# Patient Record
Sex: FEMALE | Race: OTHER | NOT HISPANIC OR LATINO | URBAN - METROPOLITAN AREA
[De-identification: names, ages, dates, MRNs, and addresses within clinical notes are randomized per-mention and may not be internally consistent; named-entity substitution may affect disease eponyms.]

---

## 2018-03-03 ENCOUNTER — EMERGENCY (EMERGENCY)
Facility: HOSPITAL | Age: 73
LOS: 1 days | Discharge: HOME | End: 2018-03-03
Admitting: EMERGENCY MEDICINE
Payer: COMMERCIAL

## 2018-03-03 VITALS
SYSTOLIC BLOOD PRESSURE: 155 MMHG | RESPIRATION RATE: 20 BRPM | TEMPERATURE: 99 F | DIASTOLIC BLOOD PRESSURE: 90 MMHG | OXYGEN SATURATION: 100 % | HEART RATE: 66 BPM

## 2018-03-03 PROCEDURE — 99284 EMERGENCY DEPT VISIT MOD MDM: CPT | Mod: 25

## 2018-03-03 RX ORDER — GABAPENTIN 400 MG/1
600 CAPSULE ORAL ONCE
Qty: 0 | Refills: 0 | Status: COMPLETED | OUTPATIENT
Start: 2018-03-03 | End: 2018-03-03

## 2018-03-03 RX ORDER — KETOROLAC TROMETHAMINE 30 MG/ML
60 SYRINGE (ML) INJECTION ONCE
Qty: 0 | Refills: 0 | Status: DISCONTINUED | OUTPATIENT
Start: 2018-03-03 | End: 2018-03-03

## 2018-03-03 RX ORDER — DIAZEPAM 5 MG
5 TABLET ORAL ONCE
Qty: 0 | Refills: 0 | Status: DISCONTINUED | OUTPATIENT
Start: 2018-03-03 | End: 2018-03-03

## 2018-03-03 RX ORDER — METOCLOPRAMIDE HCL 10 MG
10 TABLET ORAL ONCE
Qty: 0 | Refills: 0 | Status: COMPLETED | OUTPATIENT
Start: 2018-03-03 | End: 2018-03-03

## 2018-03-03 RX ADMIN — Medication 10 MILLIGRAM(S): at 23:39

## 2018-03-03 RX ADMIN — GABAPENTIN 600 MILLIGRAM(S): 400 CAPSULE ORAL at 23:39

## 2018-03-03 RX ADMIN — Medication 5 MILLIGRAM(S): at 23:39

## 2018-03-03 RX ADMIN — Medication 60 MILLIGRAM(S): at 23:39

## 2018-03-03 NOTE — ED ADULT TRIAGE NOTE - CHIEF COMPLAINT QUOTE
walkin patient with c/o intermittent right sided headache x 5 hrs. Reports hx trigeminal neuralgia. Referred by city md for control of intractable pain. Was given 10mg dex IM and 1000mg tylenol PO.

## 2018-03-03 NOTE — ED PROVIDER NOTE - PHYSICAL EXAMINATION
Gen - WFWN elderly F, NAD, comfortable in stretcher, non-toxic appearing, speaking in full sentences   Skin - warm, dry, intact  HEENT - AT/NC, PERRL, EOMI, 3mm b/l, no conjunctival injection, moist oral mucosa, o/p clear with no erythema, edema, or exudate, uvula midline, airway patent, neck supple and NT, FROM, no JVD or carotid bruits b/l, no palpable nodes  CV - S1S2, R/R/R  Resp - respiration non-labored, CTAB, symmetric bs b/l, no r/r/w  GI - NABS, soft, ND, NT, no rebound or guarding, no CVAT b/l   MS - w/w/p, no c/c/e, calves supple and NT, distal pulses symmetric b/l   Neuro - AxOx3, R parietal region tender to percussion, no temporal tenderness, erythema, edema, induration or crepitus, no focal neuro deficits, CN II-XII grossly intact, cerebellar function intact, negative pronator drift, negative nystagmus, ambulatory without gait disturbance

## 2018-03-03 NOTE — ED PROVIDER NOTE - MEDICAL DECISION MAKING DETAILS
AFVSS at time of d/c, pt non-toxic appearing and hemodynamically stable, results, ddx, and f/u plans discussed with pt at bedside, d/c'd home to f/u with PMD, strict return precautions discussed, prompt return to ER for any worsening or new sx, pt verbalized understanding. pt with h/o intractable trigeminal neuralgia, noted increased stressor recently, now with typical pain consistent with trigeminal neuralgia flare up, no focal neuro deficits on exam, AFVSS, pain adequately controlled in the ED, has had outpt recent CT and MRI brain, d/c'd home to f/u with neuro and pain management, strict return precautions discussed, prompt return to ER for any worsening or new sx, pt verbalized understanding.

## 2018-03-03 NOTE — ED PROVIDER NOTE - OBJECTIVE STATEMENT
71 yo F with PMHx of trigeminal neuralgia, GERD, sent from  for evaluation of R sided HA x 6 hrs.  Pt reports being under a lot of stress recently and noted flare up of her trigeminal neuralgia since last night.  Took some tylenol and motrin with no improvement.  Went to  today and s/p 10mg IM Dex and 1g APAP with no improvement in sx and sent in for further management.  Pain is typical of her trigeminal neuralgia and currently rated 7/10, intermittent, and feels like electric impulses. Associated with photophobia and phonophobia.  Denies dizziness, numbness, tingling, diplopia, change in vision/hearing/gait/mental status/speech, focal weakness, neck pain, rash, fever, chills, stiffness, CP, SOB, palpitations, diaphoresis, N/V/D/C, abdominal pain, change in urinary/bowel function, dysuria, hematuria, flank pain, and malaise. Has outpt neurologist, s/p CT and MRI with intractable trigeminal neuralgia

## 2018-03-04 VITALS — WEIGHT: 134.92 LBS | HEIGHT: 63 IN

## 2018-03-07 DIAGNOSIS — R51 HEADACHE: ICD-10-CM

## 2018-03-07 DIAGNOSIS — G50.0 TRIGEMINAL NEURALGIA: ICD-10-CM

## 2020-12-27 ENCOUNTER — EMERGENCY (EMERGENCY)
Facility: HOSPITAL | Age: 75
LOS: 1 days | Discharge: HOME | End: 2020-12-27
Attending: EMERGENCY MEDICINE | Admitting: EMERGENCY MEDICINE
Payer: COMMERCIAL

## 2020-12-27 VITALS
OXYGEN SATURATION: 98 % | SYSTOLIC BLOOD PRESSURE: 181 MMHG | HEIGHT: 63 IN | RESPIRATION RATE: 16 BRPM | HEART RATE: 68 BPM | TEMPERATURE: 99 F | DIASTOLIC BLOOD PRESSURE: 99 MMHG

## 2020-12-27 DIAGNOSIS — Z20.828 CONTACT WITH AND (SUSPECTED) EXPOSURE TO OTHER VIRAL COMMUNICABLE DISEASES: ICD-10-CM

## 2020-12-27 PROBLEM — K21.9 GASTRO-ESOPHAGEAL REFLUX DISEASE WITHOUT ESOPHAGITIS: Chronic | Status: ACTIVE | Noted: 2018-03-03

## 2020-12-27 PROBLEM — G50.0 TRIGEMINAL NEURALGIA: Chronic | Status: ACTIVE | Noted: 2018-03-03

## 2020-12-27 PROCEDURE — 99283 EMERGENCY DEPT VISIT LOW MDM: CPT

## 2020-12-27 NOTE — ED PROVIDER NOTE - CLINICAL SUMMARY MEDICAL DECISION MAKING FREE TEXT BOX
Asymptomatic patient here for COVID screening. Risk of exposure is very low. Reviewed vitals and testing plan with the patient. Encouraged to download the follow my health estrella for test results.

## 2020-12-27 NOTE — ED PROVIDER NOTE - NSFOLLOWUPINSTRUCTIONS_ED_ALL_ED_FT
THE COVID 19 TEST RESULTS  - results may take up to 2-3 days to become available   - if you have consented, you will receive your results electronically   -  you can check [facility] or call [phone] to discuss your results with our nursing staff    Please continue to self quarantine (home isolation) until your result is back and follow instructions accordingly  - positive: complete home isolation for a total of 14 days since day of testing and no more fever with feeling back to baseline   - negative: you will be able to stop home isolation but still practice standard precautions, similar to how you would manage a regular flu/cold.    Return to ER for any worsening symptoms, such as persistent fever >100.4F, shortness of breath, coughing up bloody sputum, chest pain, lethargy, and fainting    Please remember to wash your hands frequently (>20 seconds each time), avoid touching your face, and cover your cough/sneeze.  Always wear a mask when you are outside of your home and practice social distancing.    Only take Tylenol for fever/pain control and avoid NSAIDs (ibuprofen/Advil/Aleve/naproxen) due to potential increased risk of exacerbating COVID-19 infection

## 2020-12-27 NOTE — ED PROVIDER NOTE - PATIENT PORTAL LINK FT
You can access the FollowMyHealth Patient Portal offered by [facility] by registering at the following website: [email or web address]. By joining [employer]’s FollowMyHealth portal, you will also be able to view your health information using other applications (apps) compatible with our system.

## 2020-12-27 NOTE — ED PROVIDER NOTE - OBJECTIVE STATEMENT
The patient is presenting to the ED requesting COVID-19 screening prior to travel . The patient is currently asymptomatic and has no other medical complaints at this time. Denies headache,  fever, chills, chest pain, SOB, cough, body aches, change to sense of smell or taste

## 2021-01-25 ENCOUNTER — EMERGENCY (EMERGENCY)
Facility: HOSPITAL | Age: 76
LOS: 1 days | Discharge: HOME | End: 2021-01-25
Attending: EMERGENCY MEDICINE | Admitting: EMERGENCY MEDICINE
Payer: COMMERCIAL

## 2021-01-25 VITALS
TEMPERATURE: 98 F | HEIGHT: 63 IN | OXYGEN SATURATION: 96 % | SYSTOLIC BLOOD PRESSURE: 170 MMHG | DIASTOLIC BLOOD PRESSURE: 88 MMHG | HEART RATE: 84 BPM | RESPIRATION RATE: 18 BRPM

## 2021-01-25 DIAGNOSIS — U07.1 COVID-19: ICD-10-CM

## 2021-01-25 PROCEDURE — 99283 EMERGENCY DEPT VISIT LOW MDM: CPT

## 2021-01-25 NOTE — ED PROVIDER NOTE - OBJECTIVE STATEMENT
Patient is presenting to the Emergency Department with a request to have COVID-19 testing.  Denies symptoms, including cough, shortness of breath, fever, chills, bodyaches or sore throat.  Pt reports she tested positive for covid over a month ago, would like to be screened for COVID as well as be tested for antibodies.  Pt reports no known exposures or symptoms

## 2021-01-25 NOTE — ED PROVIDER NOTE - PATIENT PORTAL LINK FT
You can access the FollowMyHealth Patient Portal offered by [facility] by registering at the following website: [email or web address]. By joining [employer]’s FollowMyHealth portal, you will also be able to view your health information using other applications (apps) compatible with our system.

## 2021-01-25 NOTE — ED PROVIDER NOTE - PROGRESS NOTE DETAILS
Noted elevated BP.  Pt reports it is always elevated even at her PMD office.  Advised recheck and follow up BP with PMD

## 2021-01-26 LAB
SARS-COV-2 IGG SERPL QL IA: POSITIVE
SARS-COV-2 IGM SERPL IA-ACNC: 3.2 INDEX — HIGH

## 2021-01-27 LAB — SARS-COV-2 RNA SPEC QL NAA+PROBE: SIGNIFICANT CHANGE UP

## 2021-02-23 ENCOUNTER — APPOINTMENT (OUTPATIENT)
Dept: NEUROLOGY | Facility: CLINIC | Age: 76
End: 2021-02-23
Payer: COMMERCIAL

## 2021-02-23 VITALS
SYSTOLIC BLOOD PRESSURE: 150 MMHG | DIASTOLIC BLOOD PRESSURE: 100 MMHG | HEART RATE: 63 BPM | BODY MASS INDEX: 22.49 KG/M2 | WEIGHT: 135 LBS | OXYGEN SATURATION: 97 % | TEMPERATURE: 97.5 F | HEIGHT: 65 IN

## 2021-02-23 DIAGNOSIS — R42 DIZZINESS AND GIDDINESS: ICD-10-CM

## 2021-02-23 DIAGNOSIS — R41.3 OTHER AMNESIA: ICD-10-CM

## 2021-02-23 DIAGNOSIS — Z78.9 OTHER SPECIFIED HEALTH STATUS: ICD-10-CM

## 2021-02-23 DIAGNOSIS — Z87.19 PERSONAL HISTORY OF OTHER DISEASES OF THE DIGESTIVE SYSTEM: ICD-10-CM

## 2021-02-23 DIAGNOSIS — Z60.2 PROBLEMS RELATED TO LIVING ALONE: ICD-10-CM

## 2021-02-23 DIAGNOSIS — Z86.79 PERSONAL HISTORY OF OTHER DISEASES OF THE CIRCULATORY SYSTEM: ICD-10-CM

## 2021-02-23 DIAGNOSIS — Z00.00 ENCOUNTER FOR GENERAL ADULT MEDICAL EXAMINATION W/OUT ABNORMAL FINDINGS: ICD-10-CM

## 2021-02-23 DIAGNOSIS — Z84.89 FAMILY HISTORY OF OTHER SPECIFIED CONDITIONS: ICD-10-CM

## 2021-02-23 PROCEDURE — 99204 OFFICE O/P NEW MOD 45 MIN: CPT

## 2021-02-23 PROCEDURE — 99072 ADDL SUPL MATRL&STAF TM PHE: CPT

## 2021-02-23 RX ORDER — DEXLANSOPRAZOLE 60 MG/1
60 CAPSULE, DELAYED RELEASE ORAL DAILY
Refills: 0 | Status: ACTIVE | COMMUNITY

## 2021-02-23 SDOH — SOCIAL STABILITY - SOCIAL INSECURITY: PROBLEMS RELATED TO LIVING ALONE: Z60.2

## 2021-02-26 ENCOUNTER — APPOINTMENT (OUTPATIENT)
Dept: NEUROLOGY | Facility: CLINIC | Age: 76
End: 2021-02-26
Payer: COMMERCIAL

## 2021-02-26 PROCEDURE — 99072 ADDL SUPL MATRL&STAF TM PHE: CPT

## 2021-02-26 PROCEDURE — 95819 EEG AWAKE AND ASLEEP: CPT

## 2021-03-01 LAB
TSH SERPL-ACNC: 2.66 UIU/ML
VIT B12 SERPL-MCNC: 682 PG/ML

## 2021-03-10 ENCOUNTER — RESULT REVIEW (OUTPATIENT)
Age: 76
End: 2021-03-10

## 2021-03-10 ENCOUNTER — APPOINTMENT (OUTPATIENT)
Dept: MRI IMAGING | Facility: CLINIC | Age: 76
End: 2021-03-10
Payer: COMMERCIAL

## 2021-03-10 ENCOUNTER — OUTPATIENT (OUTPATIENT)
Dept: OUTPATIENT SERVICES | Facility: HOSPITAL | Age: 76
LOS: 1 days | End: 2021-03-10

## 2021-03-10 PROCEDURE — 70551 MRI BRAIN STEM W/O DYE: CPT | Mod: 26

## 2021-03-15 ENCOUNTER — NON-APPOINTMENT (OUTPATIENT)
Age: 76
End: 2021-03-15

## 2021-03-17 DIAGNOSIS — I63.81 OTHER CEREBRAL INFARCTION DUE TO OCCLUSION OR STENOSIS OF SMALL ARTERY: ICD-10-CM

## 2021-04-07 LAB
ALBUMIN SERPL ELPH-MCNC: 4.5 G/DL
ALP BLD-CCNC: 51 U/L
ALT SERPL-CCNC: 18 U/L
ANION GAP SERPL CALC-SCNC: 12 MMOL/L
AST SERPL-CCNC: 22 U/L
BILIRUB SERPL-MCNC: 0.4 MG/DL
BUN SERPL-MCNC: 16 MG/DL
CALCIUM SERPL-MCNC: 9.2 MG/DL
CHLORIDE SERPL-SCNC: 107 MMOL/L
CHOLEST SERPL-MCNC: 229 MG/DL
CO2 SERPL-SCNC: 22 MMOL/L
CREAT SERPL-MCNC: 0.65 MG/DL
ESTIMATED AVERAGE GLUCOSE: 103 MG/DL
GLUCOSE SERPL-MCNC: 96 MG/DL
HBA1C MFR BLD HPLC: 5.2 %
HDLC SERPL-MCNC: 56 MG/DL
LDLC SERPL CALC-MCNC: 151 MG/DL
NONHDLC SERPL-MCNC: 173 MG/DL
POTASSIUM SERPL-SCNC: 4.5 MMOL/L
PROT SERPL-MCNC: 6.8 G/DL
SODIUM SERPL-SCNC: 142 MMOL/L
TRIGL SERPL-MCNC: 110 MG/DL

## 2021-04-13 ENCOUNTER — APPOINTMENT (OUTPATIENT)
Dept: MRI IMAGING | Facility: CLINIC | Age: 76
End: 2021-04-13
Payer: COMMERCIAL

## 2021-04-13 ENCOUNTER — OUTPATIENT (OUTPATIENT)
Dept: OUTPATIENT SERVICES | Facility: HOSPITAL | Age: 76
LOS: 1 days | End: 2021-04-13

## 2021-04-13 PROCEDURE — 70544 MR ANGIOGRAPHY HEAD W/O DYE: CPT | Mod: 26

## 2021-04-17 ENCOUNTER — APPOINTMENT (OUTPATIENT)
Dept: CT IMAGING | Facility: CLINIC | Age: 76
End: 2021-04-17

## 2021-04-20 ENCOUNTER — APPOINTMENT (OUTPATIENT)
Dept: NEUROLOGY | Facility: CLINIC | Age: 76
End: 2021-04-20
Payer: COMMERCIAL

## 2021-04-20 VITALS
RESPIRATION RATE: 16 BRPM | WEIGHT: 135 LBS | HEART RATE: 70 BPM | DIASTOLIC BLOOD PRESSURE: 78 MMHG | TEMPERATURE: 97.2 F | BODY MASS INDEX: 21.69 KG/M2 | HEIGHT: 66 IN | SYSTOLIC BLOOD PRESSURE: 130 MMHG | OXYGEN SATURATION: 96 %

## 2021-04-20 DIAGNOSIS — M54.32 SCIATICA, LEFT SIDE: ICD-10-CM

## 2021-04-20 PROCEDURE — 99072 ADDL SUPL MATRL&STAF TM PHE: CPT

## 2021-04-20 PROCEDURE — 99214 OFFICE O/P EST MOD 30 MIN: CPT

## 2021-04-23 ENCOUNTER — APPOINTMENT (OUTPATIENT)
Dept: CT IMAGING | Facility: CLINIC | Age: 76
End: 2021-04-23
Payer: COMMERCIAL

## 2021-04-23 ENCOUNTER — OUTPATIENT (OUTPATIENT)
Dept: OUTPATIENT SERVICES | Facility: HOSPITAL | Age: 76
LOS: 1 days | End: 2021-04-23

## 2021-04-23 ENCOUNTER — RESULT REVIEW (OUTPATIENT)
Age: 76
End: 2021-04-23

## 2021-04-23 PROCEDURE — 70496 CT ANGIOGRAPHY HEAD: CPT | Mod: 26

## 2021-04-23 PROCEDURE — 70498 CT ANGIOGRAPHY NECK: CPT | Mod: 26

## 2021-06-22 ENCOUNTER — APPOINTMENT (OUTPATIENT)
Dept: NEUROLOGY | Facility: CLINIC | Age: 76
End: 2021-06-22

## 2021-06-23 DIAGNOSIS — G50.0 TRIGEMINAL NEURALGIA: ICD-10-CM

## 2021-08-24 ENCOUNTER — APPOINTMENT (OUTPATIENT)
Dept: NEUROLOGY | Facility: CLINIC | Age: 76
End: 2021-08-24

## 2022-05-12 ENCOUNTER — APPOINTMENT (OUTPATIENT)
Dept: ORTHOPEDIC SURGERY | Facility: CLINIC | Age: 77
End: 2022-05-12
Payer: COMMERCIAL

## 2022-05-12 VITALS — BODY MASS INDEX: 21.69 KG/M2 | WEIGHT: 135 LBS | HEIGHT: 66 IN

## 2022-05-12 DIAGNOSIS — M75.21 BICIPITAL TENDINITIS, RIGHT SHOULDER: ICD-10-CM

## 2022-05-12 PROCEDURE — 99204 OFFICE O/P NEW MOD 45 MIN: CPT

## 2022-05-13 PROBLEM — M75.21 BICEPS TENDINITIS OF RIGHT UPPER EXTREMITY: Status: ACTIVE | Noted: 2022-05-12

## 2022-05-24 ENCOUNTER — OUTPATIENT (OUTPATIENT)
Dept: OUTPATIENT SERVICES | Facility: HOSPITAL | Age: 77
LOS: 1 days | End: 2022-05-24

## 2022-05-24 ENCOUNTER — APPOINTMENT (OUTPATIENT)
Dept: RADIOLOGY | Facility: CLINIC | Age: 77
End: 2022-05-24
Payer: COMMERCIAL

## 2022-05-24 PROCEDURE — 71046 X-RAY EXAM CHEST 2 VIEWS: CPT | Mod: 26

## 2022-06-23 ENCOUNTER — OUTPATIENT (OUTPATIENT)
Dept: OUTPATIENT SERVICES | Facility: HOSPITAL | Age: 77
LOS: 1 days | End: 2022-06-23

## 2022-06-23 ENCOUNTER — APPOINTMENT (OUTPATIENT)
Dept: CT IMAGING | Facility: CLINIC | Age: 77
End: 2022-06-23

## 2022-06-23 ENCOUNTER — APPOINTMENT (OUTPATIENT)
Dept: RADIOLOGY | Facility: CLINIC | Age: 77
End: 2022-06-23
Payer: COMMERCIAL

## 2022-06-23 PROCEDURE — 75571 CT HRT W/O DYE W/CA TEST: CPT | Mod: 26

## 2022-06-23 PROCEDURE — 77080 DXA BONE DENSITY AXIAL: CPT | Mod: 26

## 2022-11-30 ENCOUNTER — EMERGENCY (EMERGENCY)
Facility: HOSPITAL | Age: 77
LOS: 1 days | Discharge: HOME | End: 2022-11-30
Attending: EMERGENCY MEDICINE | Admitting: EMERGENCY MEDICINE

## 2022-11-30 VITALS
HEIGHT: 65 IN | SYSTOLIC BLOOD PRESSURE: 136 MMHG | WEIGHT: 134.92 LBS | RESPIRATION RATE: 18 BRPM | DIASTOLIC BLOOD PRESSURE: 85 MMHG | HEART RATE: 83 BPM | TEMPERATURE: 98 F | OXYGEN SATURATION: 96 %

## 2022-11-30 VITALS
TEMPERATURE: 98 F | OXYGEN SATURATION: 98 % | SYSTOLIC BLOOD PRESSURE: 124 MMHG | RESPIRATION RATE: 17 BRPM | DIASTOLIC BLOOD PRESSURE: 80 MMHG | HEART RATE: 76 BPM

## 2022-11-30 DIAGNOSIS — G50.0 TRIGEMINAL NEURALGIA: ICD-10-CM

## 2022-11-30 DIAGNOSIS — R07.89 OTHER CHEST PAIN: ICD-10-CM

## 2022-11-30 DIAGNOSIS — Z87.19 PERSONAL HISTORY OF OTHER DISEASES OF THE DIGESTIVE SYSTEM: ICD-10-CM

## 2022-11-30 DIAGNOSIS — R51.9 HEADACHE, UNSPECIFIED: ICD-10-CM

## 2022-11-30 DIAGNOSIS — Z20.822 CONTACT WITH AND (SUSPECTED) EXPOSURE TO COVID-19: ICD-10-CM

## 2022-11-30 LAB
ALBUMIN SERPL ELPH-MCNC: 4 G/DL — SIGNIFICANT CHANGE UP (ref 3.4–5)
ALP SERPL-CCNC: 44 U/L — SIGNIFICANT CHANGE UP (ref 40–120)
ALT FLD-CCNC: 23 U/L — SIGNIFICANT CHANGE UP (ref 12–42)
ANION GAP SERPL CALC-SCNC: 10 MMOL/L — SIGNIFICANT CHANGE UP (ref 9–16)
AST SERPL-CCNC: 27 U/L — SIGNIFICANT CHANGE UP (ref 15–37)
BASOPHILS # BLD AUTO: 0.05 K/UL — SIGNIFICANT CHANGE UP (ref 0–0.2)
BASOPHILS NFR BLD AUTO: 0.5 % — SIGNIFICANT CHANGE UP (ref 0–2)
BILIRUB SERPL-MCNC: 0.3 MG/DL — SIGNIFICANT CHANGE UP (ref 0.2–1.2)
BUN SERPL-MCNC: 21 MG/DL — SIGNIFICANT CHANGE UP (ref 7–23)
CALCIUM SERPL-MCNC: 9.3 MG/DL — SIGNIFICANT CHANGE UP (ref 8.5–10.5)
CHLORIDE SERPL-SCNC: 100 MMOL/L — SIGNIFICANT CHANGE UP (ref 96–108)
CO2 SERPL-SCNC: 25 MMOL/L — SIGNIFICANT CHANGE UP (ref 22–31)
CREAT SERPL-MCNC: 0.8 MG/DL — SIGNIFICANT CHANGE UP (ref 0.5–1.3)
D DIMER BLD IA.RAPID-MCNC: <187 NG/ML DDU — SIGNIFICANT CHANGE UP
EGFR: 75 ML/MIN/1.73M2 — SIGNIFICANT CHANGE UP
EOSINOPHIL # BLD AUTO: 0.03 K/UL — SIGNIFICANT CHANGE UP (ref 0–0.5)
EOSINOPHIL NFR BLD AUTO: 0.3 % — SIGNIFICANT CHANGE UP (ref 0–6)
FLUAV AG NPH QL: SIGNIFICANT CHANGE UP
FLUBV AG NPH QL: SIGNIFICANT CHANGE UP
GLUCOSE SERPL-MCNC: 107 MG/DL — HIGH (ref 70–99)
HCT VFR BLD CALC: 37.6 % — SIGNIFICANT CHANGE UP (ref 34.5–45)
HGB BLD-MCNC: 12.9 G/DL — SIGNIFICANT CHANGE UP (ref 11.5–15.5)
IMM GRANULOCYTES NFR BLD AUTO: 0.4 % — SIGNIFICANT CHANGE UP (ref 0–0.9)
LIDOCAIN IGE QN: 164 U/L — SIGNIFICANT CHANGE UP (ref 73–393)
LYMPHOCYTES # BLD AUTO: 1.66 K/UL — SIGNIFICANT CHANGE UP (ref 1–3.3)
LYMPHOCYTES # BLD AUTO: 15.3 % — SIGNIFICANT CHANGE UP (ref 13–44)
MCHC RBC-ENTMCNC: 30.6 PG — SIGNIFICANT CHANGE UP (ref 27–34)
MCHC RBC-ENTMCNC: 34.3 GM/DL — SIGNIFICANT CHANGE UP (ref 32–36)
MCV RBC AUTO: 89.1 FL — SIGNIFICANT CHANGE UP (ref 80–100)
MONOCYTES # BLD AUTO: 0.84 K/UL — SIGNIFICANT CHANGE UP (ref 0–0.9)
MONOCYTES NFR BLD AUTO: 7.8 % — SIGNIFICANT CHANGE UP (ref 2–14)
NEUTROPHILS # BLD AUTO: 8.2 K/UL — HIGH (ref 1.8–7.4)
NEUTROPHILS NFR BLD AUTO: 75.7 % — SIGNIFICANT CHANGE UP (ref 43–77)
NRBC # BLD: 0 /100 WBCS — SIGNIFICANT CHANGE UP (ref 0–0)
NT-PROBNP SERPL-SCNC: 67 PG/ML — SIGNIFICANT CHANGE UP
PLATELET # BLD AUTO: 204 K/UL — SIGNIFICANT CHANGE UP (ref 150–400)
POTASSIUM SERPL-MCNC: 3.2 MMOL/L — LOW (ref 3.5–5.3)
POTASSIUM SERPL-SCNC: 3.2 MMOL/L — LOW (ref 3.5–5.3)
PROT SERPL-MCNC: 7.6 G/DL — SIGNIFICANT CHANGE UP (ref 6.4–8.2)
RBC # BLD: 4.22 M/UL — SIGNIFICANT CHANGE UP (ref 3.8–5.2)
RBC # FLD: 11.9 % — SIGNIFICANT CHANGE UP (ref 10.3–14.5)
RSV RNA NPH QL NAA+NON-PROBE: SIGNIFICANT CHANGE UP
SARS-COV-2 RNA SPEC QL NAA+PROBE: SIGNIFICANT CHANGE UP
SODIUM SERPL-SCNC: 135 MMOL/L — SIGNIFICANT CHANGE UP (ref 132–145)
TROPONIN I, HIGH SENSITIVITY RESULT: 7.3 NG/L — SIGNIFICANT CHANGE UP
WBC # BLD: 10.82 K/UL — HIGH (ref 3.8–10.5)
WBC # FLD AUTO: 10.82 K/UL — HIGH (ref 3.8–10.5)

## 2022-11-30 PROCEDURE — 71250 CT THORAX DX C-: CPT | Mod: 26

## 2022-11-30 PROCEDURE — 99285 EMERGENCY DEPT VISIT HI MDM: CPT

## 2022-11-30 PROCEDURE — 71046 X-RAY EXAM CHEST 2 VIEWS: CPT | Mod: 26

## 2022-11-30 PROCEDURE — 93010 ELECTROCARDIOGRAM REPORT: CPT

## 2022-11-30 RX ORDER — DEXAMETHASONE 0.5 MG/5ML
10 ELIXIR ORAL ONCE
Refills: 0 | Status: COMPLETED | OUTPATIENT
Start: 2022-11-30 | End: 2022-11-30

## 2022-11-30 RX ORDER — ACETAMINOPHEN 500 MG
650 TABLET ORAL ONCE
Refills: 0 | Status: COMPLETED | OUTPATIENT
Start: 2022-11-30 | End: 2022-11-30

## 2022-11-30 RX ADMIN — Medication 650 MILLIGRAM(S): at 18:10

## 2022-11-30 RX ADMIN — Medication 10 MILLIGRAM(S): at 21:05

## 2022-11-30 RX ADMIN — Medication 102 MILLIGRAM(S): at 18:10

## 2022-11-30 RX ADMIN — Medication 650 MILLIGRAM(S): at 21:05

## 2022-11-30 NOTE — ED PROVIDER NOTE - OBJECTIVE STATEMENT
77-year-old female with history of trigeminal neuralgia presents with exacerbation of chronic trigeminal neuralgia pain to the right side of her face for 2 days.  Patient is requesting a steroid shot which she says is the only thing that helps her with pain.  States she gets flares of trigeminal neuralgia approximately 2-3 times per year for which she usually goes to the ED for steroids.  Patient is also complaining of sharp burning right rib pain which started at rest last night.  Pain is not positional, not triggered by exertion.  Pain is worse with deep inspiration.  No cough or hemoptysis.  No leg swelling.  No deep substernal chest pressure.

## 2022-11-30 NOTE — ED ADULT NURSE NOTE - OBJECTIVE STATEMENT
mild
Pt reports she feels like she has pneumonia, has had it several times and it presents as pain with inspiration. Pt denies fevers, no SOB, no cough. Pt also reports she has neuralgia and is having burning pain to right side of head and "I need a shot."

## 2022-11-30 NOTE — ED PROVIDER NOTE - PHYSICAL EXAMINATION
Constitutional: awake and alert, in no acute distress  HEENT: head normocephalic and atraumatic. moist mucous membranes  Eyes: extraocular movements intact, normal conjunctiva  Neck: supple, normal ROM  Cardiovascular: regular rate   Pulmonary: no respiratory distress, CTAB  Gastrointestinal: abdomen flat and nondistended  Skin: warm, dry, normal for ethnicity, No rash to right chest wall  Musculoskeletal: no edema, no deformity, No tenderness to palpation of right chest wall  Neurological: oriented x4, no focal neurologic deficit.   Psychiatric: calm and cooperative

## 2022-11-30 NOTE — ED PROVIDER NOTE - NSFOLLOWUPINSTRUCTIONS_ED_ALL_ED_FT
Costochondritis       Costochondritis is irritation and swelling (inflammation) of the tissue that connects the ribs to the breastbone (sternum). This tissue is called cartilage.    Costochondritis causes pain in the front of the chest. Usually, the pain:  •Starts slowly.      •Is in more than one rib.        What are the causes?    The exact cause of this condition is not always known. It results from stress on the tissue in the affected area. The cause of this stress could be:  •Chest injury.       •Exercise or activity, such as lifting.       •Very bad coughing.        What increases the risk?    You are more likely to develop this condition if you:  •Are female.       •Are 30–40 years old.      •Recently started a new exercise or work activity.       •Have low levels of vitamin D.       •Have a condition that makes you cough often.        What are the signs or symptoms?    The main symptom of this condition is chest pain. The pain:  •Usually starts slowly and can be sharp or dull.      •Gets worse with deep breathing, coughing, or exercise.       •Gets better with rest.       •May be worse when you press on the affected area of your ribs and breastbone.        How is this treated?    This condition usually goes away on its own over time. Your doctor may prescribe an NSAID, such as ibuprofen. This can help reduce pain and inflammation. Treatment may also include:  •Resting and avoiding activities that make pain worse.       •Putting heat or ice on the painful area.      •Doing exercises to stretch your chest muscles.      If these treatments do not help, your doctor may inject a numbing medicine to help relieve the pain.      Follow these instructions at home:      Managing pain, stiffness, and swelling                 •If told, put ice on the painful area. To do this:  •Put ice in a plastic bag.      •Place a towel between your skin and the bag.      •Leave the ice on for 20 minutes, 2–3 times a day.      •If told, put heat on the affected area. Do this as often as told by your doctor. Use the heat source that your doctor recommends, such as a moist heat pack or a heating pad.  •Place a towel between your skin and the heat source.      •Leave the heat on for 20–30 minutes.      •Take off the heat if your skin turns bright red. This is very important if you cannot feel pain, heat, or cold. You may have a greater risk of getting burned.        Activity     •Rest as told by your doctor.      • Do not do anything that makes your pain worse. This includes any activities that use chest, belly (abdomen), and side muscles.      • Do not lift anything that is heavier than 10 lb (4.5 kg), or the limit that you are told, until your doctor says that it is safe.      •Return to your normal activities as told by your doctor. Ask your doctor what activities are safe for you.      General instructions     •Take over-the-counter and prescription medicines only as told by your doctor.      •Keep all follow-up visits as told by your doctor. This is important.        Contact a doctor if:    •You have chills or a fever.      •Your pain does not go away or it gets worse.      •You have a cough that does not go away.        Get help right away if:    •You are short of breath.      •You have very bad chest pain that is not helped by medicines, heat, or ice.      These symptoms may be an emergency. Do not wait to see if the symptoms will go away. Get medical help right away. Call your local emergency services (911 in the [place] Do not drive yourself to the hospital.       Summary    •Costochondritis is irritation and swelling (inflammation) of the tissue that connects the ribs to the breastbone (sternum).      •This condition causes pain in the front of the chest.      •Treatment may include medicines, rest, heat or ice, and exercises.      This information is not intended to replace advice given to you by your health care provider. Make sure you discuss any questions you have with your health care provider.        Trigeminal Neuralgia    Side view of a person's upper body showing the trigeminal nerve.   Trigeminal neuralgia is a nerve disorder that causes severe pain on one side of the face. The pain may last from a few seconds to several minutes, but it can happen hundreds of times a day. The pain is usually only on one side of the face.    Symptoms may occur for days, weeks, or months and then go away for months or years. The pain may return and be worse than before.      What are the causes?    This condition may be caused by:•Damage or pressure to a nerve in the head that is called the trigeminal nerve. An attack can be triggered by:  •Talking or chewing.      •Putting on makeup.      •Washing, shaving, or touching your face.      •Brushing your teeth.      •Blasts of hot or cold air.        •Primary demyelinating disorders, such as multiple sclerosis.      •Tumors.        What increases the risk?    You are more likely to develop this condition if:  •You are 50–60 years old.      •You are female.        What are the signs or symptoms?    The main symptom of this condition is severe pain in the jaw, lips, eyes, nose, scalp, forehead, and face.      How is this diagnosed?    This condition is diagnosed with a physical exam. A CT scan or an MRI may be done to rule out other conditions that can cause facial pain.      How is this treated?    This condition may be treated with:  •Measures to avoid the things that trigger your symptoms.      •Prescription medicines such as anticonvulsants.      •Procedures such as ablation, thermal, or radiation therapy.      •Cognitive or behavioral therapy.    •Complementary therapies such as:  •Gentle, regular exercise or yoga.      •Meditation.      •Aromatherapy.      •Acupuncture.        •Surgery. This may be done in severe cases if other medical treatment does not provide relief.      It may take up to one month for treatment to start relieving the pain.      Follow these instructions at home:      Managing pain   Three people participating in a counseling session.   •Learn as much as you can about how to manage your pain. Ask your health care provider if a pain specialist would be helpful.      •Consider talking with a mental health care provider about how to cope with the pain.      •Consider joining a pain support group.      General instructions     •Take over-the-counter and prescription medicines only as told by your health care provider.    •Avoid the things that trigger your symptoms. It may help to:  •Chew on the unaffected side of your mouth.      •Avoid touching your face.      •Avoid blasts of hot or cold air.        •Keep all follow-up visits.        Where to find more information    •Facial Pain Association: facepain.org        Contact a health care provider if:    •Your medicine is not helping your symptoms.      •You have side effects from the medicine used for treatment.    •You develop new, unexplained symptoms, such as:  •Double vision.      •Facial weakness or numbness.      •Changes in hearing or balance.        •You feel depressed.        Get help right away if:    •Your pain is severe and is not getting better.      •You develop suicidal thoughts.      If you ever feel like you may hurt yourself or others, or have thoughts about taking your own life, get help right away. Go to your nearest emergency department or:   • Call your local emergency services (1 in the [place]       • Call a suicide crisis helpline, such as the National Suicide Prevention Lifeline at 1-307.116.6606 or 233 in the [place] This is open 24 hours a day in the [place]       • Text the Crisis Text Line at 271383 (in the [place]         Summary    •Trigeminal neuralgia is a nerve disorder that causes severe pain on one side of the face. The pain may last from a few seconds to several minutes.      •This condition is caused by damage or pressure to a nerve in the head that is called the trigeminal nerve.      •Treatment may include avoiding the things that trigger your symptoms, taking medicines, or having procedures or surgery. It may take up to one month for treatment to start relieving the pain.      •Keep all follow-up visits.      This information is not intended to replace advice given to you by your health care provider. Make sure you discuss any questions you have with your health care provider.

## 2022-11-30 NOTE — ED PROVIDER NOTE - NS ED ROS FT
Constitutional:  No fever, No chills, No night sweats  Eyes:  No visual changes, No discharge, No redness  ENMT:  No epistaxis, no nasal congestion, no throat pain, no difficulty swallowing  CV:  +chest pain, No palpitations, No peripheral edema  Resp:  No cough, No shortness of breath  GI:  No abdominal pain, No vomiting, No diarrhea  MSK:  No neck pain or stiffness, No joint swelling or pain, No back pain  Neuro: no loss of consciousness, no gait abnormality, no headache, no sensory deficits, and no weakness.  Skin:  No abrasions, no lesions, no rashes  Psych:  No known mental health issues

## 2022-11-30 NOTE — ED ADULT TRIAGE NOTE - CHIEF COMPLAINT QUOTE
Pt walked into ER c/o right rib pain since last night, pt states she thinks she has PNA. Pt endorses pain worse with inspiration. Pt denies trauma/injury. Pt also c/o trigeminal neuralgia flare up and diarrhea last night. Pt denies SOB, fevers. Pt states only PMH is GERD.

## 2022-11-30 NOTE — ED PROVIDER NOTE - PATIENT PORTAL LINK FT
You can access the FollowMyHealth Patient Portal offered by [facility] by registering at the following website: [email or web address]. By joining [email or web address]’s FollowMyHealth portal, you will also be able to view your health information using other applications (apps) compatible with our system.

## 2022-11-30 NOTE — ED PROVIDER NOTE - PROGRESS NOTE DETAILS
Elliot: Patient had additional questions after discharge.  Not initially seen by me.  Patient notes clarification of CT reading of the chest today.  Based on past notes patient had a cardiac CT done in June which was ordered by her primary care doctor.  Patient notes she has a history of bronchiectasis and the results of the past CT were discussed with her doctor she also has pulmonary follow-up.  The CT results from today are similar to those of Lisseth if not possibly with more inflammatory changes.  Patient understands his results and understands clarification.  She will take results to her primary and discussed with her primary and her pulmonologist for further treatment.  In the past she was offered treatment for the findings on the CT but she declined.  She otherwise feels well and understands discharge instructions.

## 2022-11-30 NOTE — ED PROVIDER NOTE - CLINICAL SUMMARY MEDICAL DECISION MAKING FREE TEXT BOX
78yo F hx of trigeminal neuralgia presents with exacerbation of chronic trigeminal neuralgia pain, requesting steroids, as well as sharp burning R rib pain x2 days with pleuritic component.  On exam afebrile, VSS, well appearing, no rash to area of pain over ribs to suggest shingles.  EKG NSR, D-dimer neg.  Do not suspect PE.  CXR w possible scarring in R lung; CT chest shows chronic unchanged airway disease.  No e/o PNA.  Pt feels better after steroids, wants to go home.  Stable for dc w plan for PMD follow up.

## 2022-11-30 NOTE — ED PROVIDER NOTE - CARE PLAN
Principal Discharge DX:	Trigeminal neuralgia   1 Principal Discharge DX:	Trigeminal neuralgia  Secondary Diagnosis:	Right-sided chest wall pain

## 2023-04-06 NOTE — ED PROVIDER NOTE - EKG ADDITIONAL QUESTION - PERFORMED INDEPENDENT VISUALIZATION
Chart review completed and medication refill approved.     Unable to review ROWENA due to the manual system being down.  Yes

## 2023-04-20 ENCOUNTER — EMERGENCY (EMERGENCY)
Facility: HOSPITAL | Age: 78
LOS: 1 days | Discharge: HOME | End: 2023-04-20
Admitting: EMERGENCY MEDICINE
Payer: COMMERCIAL

## 2023-04-20 VITALS
OXYGEN SATURATION: 96 % | TEMPERATURE: 98 F | DIASTOLIC BLOOD PRESSURE: 78 MMHG | HEART RATE: 65 BPM | RESPIRATION RATE: 18 BRPM | SYSTOLIC BLOOD PRESSURE: 124 MMHG

## 2023-04-20 VITALS
SYSTOLIC BLOOD PRESSURE: 107 MMHG | RESPIRATION RATE: 16 BRPM | DIASTOLIC BLOOD PRESSURE: 51 MMHG | HEART RATE: 92 BPM | TEMPERATURE: 98 F | WEIGHT: 138.01 LBS

## 2023-04-20 PROCEDURE — 99284 EMERGENCY DEPT VISIT MOD MDM: CPT

## 2023-04-20 RX ORDER — DEXAMETHASONE 0.5 MG/5ML
10 ELIXIR ORAL ONCE
Refills: 0 | Status: COMPLETED | OUTPATIENT
Start: 2023-04-20 | End: 2023-04-20

## 2023-04-20 RX ADMIN — Medication 10 MILLIGRAM(S): at 15:39

## 2023-04-20 NOTE — ED PROVIDER NOTE - OBJECTIVE STATEMENT
76 y/o F with PMH of trigeminal neuralgia presents to the ED for facial pain. Pt was recommended to have MRI imaging done for this. Pt came to the ED today for worsening pain and is requesting a "steroid shot" as it helped in the past. Pt was prescribed Pregabalin but it has not provided any relief. She denies fevers or chills.

## 2023-04-20 NOTE — ED PROVIDER NOTE - PATIENT PORTAL LINK FT
You can access the FollowMyHealth Patient Portal offered by [facility] by registering at the following website: [email or web address]. By joining [employer]’s FollowMyHealth portal, you will also be able to view your health information using other applications (apps) compatible with our system.

## 2023-04-20 NOTE — ED PROVIDER NOTE - CLINICAL SUMMARY MEDICAL DECISION MAKING FREE TEXT BOX
76 y/o F presenting with facial pain 2/2 trigeminal neuralgia. On exam, Pt appears well and in no acute distress. Plan for Tx with Decadron. Anticipate discharge afterwards.

## 2023-04-24 DIAGNOSIS — G50.0 TRIGEMINAL NEURALGIA: ICD-10-CM

## 2023-04-24 DIAGNOSIS — R51.9 HEADACHE, UNSPECIFIED: ICD-10-CM

## 2023-04-27 ENCOUNTER — APPOINTMENT (OUTPATIENT)
Dept: MRI IMAGING | Facility: CLINIC | Age: 78
End: 2023-04-27
Payer: COMMERCIAL

## 2023-04-27 ENCOUNTER — OUTPATIENT (OUTPATIENT)
Dept: OUTPATIENT SERVICES | Facility: HOSPITAL | Age: 78
LOS: 1 days | End: 2023-04-27

## 2023-04-27 PROCEDURE — 70553 MRI BRAIN STEM W/O & W/DYE: CPT | Mod: 26

## 2023-05-11 ENCOUNTER — APPOINTMENT (OUTPATIENT)
Dept: NEUROLOGY | Facility: CLINIC | Age: 78
End: 2023-05-11

## 2023-07-24 ENCOUNTER — APPOINTMENT (OUTPATIENT)
Dept: OPHTHALMOLOGY | Facility: CLINIC | Age: 78
End: 2023-07-24
Payer: COMMERCIAL

## 2023-07-24 ENCOUNTER — NON-APPOINTMENT (OUTPATIENT)
Age: 78
End: 2023-07-24

## 2023-07-24 PROCEDURE — 92002 INTRM OPH EXAM NEW PATIENT: CPT

## 2023-07-31 ENCOUNTER — APPOINTMENT (OUTPATIENT)
Dept: OPHTHALMOLOGY | Facility: CLINIC | Age: 78
End: 2023-07-31
Payer: COMMERCIAL

## 2023-07-31 ENCOUNTER — NON-APPOINTMENT (OUTPATIENT)
Age: 78
End: 2023-07-31

## 2023-07-31 PROCEDURE — 92060 SENSORIMOTOR EXAMINATION: CPT

## 2023-07-31 PROCEDURE — 92004 COMPRE OPH EXAM NEW PT 1/>: CPT

## 2023-10-01 PROBLEM — I63.81 LACUNAR INFARCTION: Status: ACTIVE | Noted: 2021-03-17

## 2023-10-05 ENCOUNTER — APPOINTMENT (OUTPATIENT)
Dept: NEUROLOGY | Facility: CLINIC | Age: 78
End: 2023-10-05

## 2023-10-12 ENCOUNTER — APPOINTMENT (OUTPATIENT)
Dept: NEUROLOGY | Facility: CLINIC | Age: 78
End: 2023-10-12

## 2024-01-16 ENCOUNTER — APPOINTMENT (OUTPATIENT)
Dept: ULTRASOUND IMAGING | Facility: CLINIC | Age: 79
End: 2024-01-16
Payer: COMMERCIAL

## 2024-01-16 ENCOUNTER — APPOINTMENT (OUTPATIENT)
Dept: CT IMAGING | Facility: CLINIC | Age: 79
End: 2024-01-16
Payer: COMMERCIAL

## 2024-01-16 ENCOUNTER — TRANSCRIPTION ENCOUNTER (OUTPATIENT)
Age: 79
End: 2024-01-16

## 2024-01-16 ENCOUNTER — OUTPATIENT (OUTPATIENT)
Dept: OUTPATIENT SERVICES | Facility: HOSPITAL | Age: 79
LOS: 1 days | End: 2024-01-16

## 2024-01-16 PROCEDURE — 76700 US EXAM ABDOM COMPLETE: CPT | Mod: 26

## 2024-01-16 PROCEDURE — 74177 CT ABD & PELVIS W/CONTRAST: CPT | Mod: 26

## 2024-05-20 ENCOUNTER — OUTPATIENT (OUTPATIENT)
Dept: OUTPATIENT SERVICES | Facility: HOSPITAL | Age: 79
LOS: 1 days | End: 2024-05-20

## 2024-05-20 ENCOUNTER — APPOINTMENT (OUTPATIENT)
Dept: RADIOLOGY | Facility: CLINIC | Age: 79
End: 2024-05-20
Payer: COMMERCIAL

## 2024-05-20 PROCEDURE — 71046 X-RAY EXAM CHEST 2 VIEWS: CPT | Mod: 26

## 2024-05-30 ENCOUNTER — APPOINTMENT (OUTPATIENT)
Dept: CT IMAGING | Facility: CLINIC | Age: 79
End: 2024-05-30
Payer: COMMERCIAL

## 2024-05-30 PROCEDURE — 71260 CT THORAX DX C+: CPT | Mod: 26

## 2024-06-06 DIAGNOSIS — J47.9 BRONCHIECTASIS, UNCOMPLICATED: ICD-10-CM

## 2024-06-10 ENCOUNTER — APPOINTMENT (OUTPATIENT)
Dept: PULMONOLOGY | Facility: CLINIC | Age: 79
End: 2024-06-10
Payer: COMMERCIAL

## 2024-06-10 ENCOUNTER — RESULT REVIEW (OUTPATIENT)
Age: 79
End: 2024-06-10

## 2024-06-10 VITALS
TEMPERATURE: 97.5 F | BODY MASS INDEX: 21.38 KG/M2 | DIASTOLIC BLOOD PRESSURE: 78 MMHG | HEIGHT: 66 IN | SYSTOLIC BLOOD PRESSURE: 118 MMHG | WEIGHT: 133 LBS | HEART RATE: 73 BPM | OXYGEN SATURATION: 96 %

## 2024-06-10 DIAGNOSIS — R60.0 LOCALIZED EDEMA: ICD-10-CM

## 2024-06-10 PROCEDURE — 94060 EVALUATION OF WHEEZING: CPT

## 2024-06-10 PROCEDURE — 94726 PLETHYSMOGRAPHY LUNG VOLUMES: CPT

## 2024-06-10 PROCEDURE — G2212 PROLONG OUTPT/OFFICE VIS: CPT

## 2024-06-10 PROCEDURE — 99417 PROLNG OP E/M EACH 15 MIN: CPT

## 2024-06-10 PROCEDURE — 99205 OFFICE O/P NEW HI 60 MIN: CPT | Mod: 25

## 2024-06-10 PROCEDURE — G2211 COMPLEX E/M VISIT ADD ON: CPT

## 2024-06-10 PROCEDURE — ZZZZZ: CPT

## 2024-06-10 PROCEDURE — 94729 DIFFUSING CAPACITY: CPT

## 2024-06-10 RX ORDER — ALBUTEROL SULFATE 90 UG/1
108 (90 BASE) INHALANT RESPIRATORY (INHALATION)
Qty: 1 | Refills: 2 | Status: ACTIVE | COMMUNITY
Start: 2024-06-10 | End: 1900-01-01

## 2024-06-10 RX ORDER — HYDROCHLOROTHIAZIDE 12.5 MG/1
12.5 TABLET ORAL
Refills: 0 | Status: ACTIVE | COMMUNITY
Start: 2024-06-10

## 2024-06-10 RX ORDER — MELOXICAM 15 MG/1
15 TABLET ORAL
Qty: 15 | Refills: 0 | Status: DISCONTINUED | COMMUNITY
Start: 2022-05-12 | End: 2024-06-10

## 2024-06-10 RX ORDER — SODIUM CHLORIDE FOR INHALATION 3 %
3 VIAL, NEBULIZER (ML) INHALATION
Qty: 1 | Refills: 5 | Status: ACTIVE | COMMUNITY
Start: 2024-06-10 | End: 1900-01-01

## 2024-06-10 RX ORDER — ASPIRIN 81 MG/1
81 TABLET ORAL DAILY
Qty: 30 | Refills: 5 | Status: DISCONTINUED | COMMUNITY
Start: 2021-03-17 | End: 2024-06-10

## 2024-06-10 RX ORDER — ROSUVASTATIN CALCIUM 20 MG/1
20 TABLET, FILM COATED ORAL
Refills: 0 | Status: ACTIVE | COMMUNITY
Start: 2024-06-10

## 2024-06-10 RX ORDER — GABAPENTIN 300 MG/1
300 CAPSULE ORAL
Qty: 90 | Refills: 3 | Status: DISCONTINUED | COMMUNITY
Start: 2021-06-23 | End: 2024-06-10

## 2024-06-10 RX ORDER — ATORVASTATIN CALCIUM 40 MG/1
40 TABLET, FILM COATED ORAL
Qty: 90 | Refills: 1 | Status: DISCONTINUED | COMMUNITY
Start: 2021-04-07 | End: 2024-06-10

## 2024-06-10 RX ORDER — LISINOPRIL 10 MG/1
10 TABLET ORAL DAILY
Refills: 0 | Status: DISCONTINUED | COMMUNITY
End: 2024-06-10

## 2024-06-10 RX ORDER — GABAPENTIN 300 MG/1
300 CAPSULE ORAL
Qty: 30 | Refills: 3 | Status: DISCONTINUED | COMMUNITY
Start: 2021-04-20 | End: 2024-06-10

## 2024-06-10 NOTE — HISTORY OF PRESENT ILLNESS
[TextBox_4] : 78 yo woman with h/o bronchiectasis and NTM LD who presents for initiation evaluation  For over 10 years, recurrent pneumonias. Most recent one in April of this year (one prior to that in 2019) No childhood problems with infections, breathing, lungs. Strong fam hx of bxsis (mother  of it) and NTM LD (mother and daughter). Daughter also has severe asthma. Pt's son "prone to bronchitis". Brother is healthy  Was seeing Dr. Stanley for bxsis and NTM for years. Was prescribed airway clearance (Aerobika). Never needed MAC rx Pt's daughter also seeing Dr. QUEVEDO and has not needed NTM rx  In April, while in [place], pt dx w PNA. Rx with Z-ack, then Augmentin. No cx done per pt. Finally felt better about 2 weeks ago. Has lost 18 lb. Chect XR and CT done by PCP: progression of bxsis findings  Feeling mostly well at this time.  No cough Does have dyspnea since pna above. Has to walk more slowly to catch breath. Drenching night sweats since pna. Improving though; no longer drenching.  Chronic postnasal drip. No therapy Chronic GERD (on Dexilant on sx)

## 2024-06-10 NOTE — RESULTS/DATA
[TextEntry] : RADIOLOGY  24 CT chest FINDINGS: LUNGS AND AIRWAYS: Persistent atelectasis and bronchiectasis within the lingula and right middle lobe, overall unchanged since 2024 and slightly progressed since 2022. Moderate severity micronodular tree-in-bud branching opacities within the subpleural right lower lobe, lingula, right middle lobe and subpleural left lower lobe is overall progressed since 2022. There are focal regions of patchy atelectasis within the subpleural region of the right lower lobe also progressed since 2024 and 2022.. No suspicious pulmonary finding. PLEURA: No pleural effusion. MEDIASTINUM AND TAMIKO: No lymphadenopathy. VESSELS: No central pulmonary embolus. HEART: Heart size is normal. No pericardial effusion. Minimal calcification of the aortic valve, which can correlate with degree of aortic stenosis. CHEST WALL AND LOWER NECK: Within normal limits. VISUALIZED UPPER ABDOMEN: Right renal cortical scarring. Punctate 1 mm calculus in the interpolar region of the right kidney. BONES: Within normal limits.  IMPRESSION:  Overall interval progression of moderate multifocal infectious/inflammatory bronchiolitis; etiologies include EVER; progressive multifocal atelectasis.   PFT 6/10/2024 Prebronchodilator FVC: 2.17 L, 83% predicted Prebronchodilator FEV1: 1.29 L, 66% predicted FEV1/FVC: 60% Postbronchodilator FVC 2.38 L, 92% predicted (+10%) Postbronchodilator FEV1: 1.39 L, 71% predicted (+8%) Postbronchodilator FEV1/FVC: 58% T.07 L, 99% predicted RV: 2.85 L, 125% predicted RV/T% DLCO unadjusted for hemoglobin: 13.3 units, 68% predicted Impression: Moderate obstructive ventilatory deficit with partial bronchodilator response.  No hyperinflation.  Mild air trapping.  Mildly reduced diffusing capacity   EKG / ECHO      MICRO     PATH    OTHER LABS OF NOTE

## 2024-06-10 NOTE — PHYSICAL EXAM
[TextEntry] : Gen: Pleasant in no distress HEENT: Sclera non-icteric, conjunctiva non-injected; oropharynx clear w/o thrush;  Neck: supple w/o cervical LAD; no bruits CV: Regular rate and rhythm, no murmurs, rubs or gallops Lungs: CTAB, no rales, wheezes, squeaks or rhonchi Extremities: no clubbing, cyanosis; trace 1+ L>R ankle edema Skin: no rash Psych: normal mood and affect Neuro:  non-focal

## 2024-06-10 NOTE — ASSESSMENT
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, GERD, post-nasal drip  #Bronchiectasis #NTM LD #Recurrent pneumonias #Obstructive airways disease (moderate obstruction, partial BD response) #Fam hx of bxsis and NTM LD #Fam hx of asthma #Post-nasal drip #GERD #LE edema  --bxsis labs --referral to CF group for CF/PCD testing --airway clearance twice a day:   -2 puffs of albuterol via spacer ( Inhaler technique was reviewed, and proper technique was demonstrated to the patient; spacer provided)   -hypertonic (3%) saline neb x 15-20 min   -Aerobika x 10 min (device use demonstrated)   -Abdul-cough (technique demonstrated) --sputum cx for bacteria and mycobacteria (cups provided) --educational handouts provided to pt --discussed dietary modifications in addition to Dexillant for GERD --consider referral to ENT and GI for PND/GERD respectively --LE doppler r/o DVT --FU 4 weeks

## 2024-06-11 ENCOUNTER — APPOINTMENT (OUTPATIENT)
Dept: ULTRASOUND IMAGING | Facility: CLINIC | Age: 79
End: 2024-06-11
Payer: COMMERCIAL

## 2024-06-11 ENCOUNTER — OUTPATIENT (OUTPATIENT)
Dept: OUTPATIENT SERVICES | Facility: HOSPITAL | Age: 79
LOS: 1 days | End: 2024-06-11

## 2024-06-11 LAB
BASOPHILS # BLD AUTO: 0.06 K/UL
BASOPHILS NFR BLD AUTO: 0.9 %
EOSINOPHIL # BLD AUTO: 0.14 K/UL
EOSINOPHIL NFR BLD AUTO: 2.2 %
HCT VFR BLD CALC: 40 %
HGB BLD-MCNC: 11.9 G/DL
IMM GRANULOCYTES NFR BLD AUTO: 0.2 %
LYMPHOCYTES # BLD AUTO: 1.91 K/UL
LYMPHOCYTES NFR BLD AUTO: 30.2 %
MAN DIFF?: NORMAL
MCHC RBC-ENTMCNC: 28.6 PG
MCHC RBC-ENTMCNC: 29.8 GM/DL
MCV RBC AUTO: 96.2 FL
MONOCYTES # BLD AUTO: 0.32 K/UL
MONOCYTES NFR BLD AUTO: 5.1 %
NEUTROPHILS # BLD AUTO: 3.88 K/UL
NEUTROPHILS NFR BLD AUTO: 61.4 %
PLATELET # BLD AUTO: 259 K/UL
RBC # BLD: 4.16 M/UL
RBC # FLD: 14.1 %
WBC # FLD AUTO: 6.32 K/UL

## 2024-06-11 PROCEDURE — 93970 EXTREMITY STUDY: CPT | Mod: 26

## 2024-06-12 LAB
25(OH)D3 SERPL-MCNC: 56.8 NG/ML
A1AT SERPL-MCNC: 181 MG/DL
ALBUMIN SERPL ELPH-MCNC: 4.5 G/DL
ALP BLD-CCNC: 47 U/L
ALT SERPL-CCNC: 11 U/L
ANION GAP SERPL CALC-SCNC: 16 MMOL/L
AST SERPL-CCNC: 21 U/L
BILIRUB SERPL-MCNC: 0.3 MG/DL
BUN SERPL-MCNC: 19 MG/DL
CALCIUM SERPL-MCNC: 9.8 MG/DL
CHLORIDE SERPL-SCNC: 102 MMOL/L
CO2 SERPL-SCNC: 22 MMOL/L
CREAT SERPL-MCNC: 0.79 MG/DL
CRP SERPL-MCNC: 5 MG/L
DEPRECATED KAPPA LC FREE/LAMBDA SER: 0.98 RATIO
EGFR: 75 ML/MIN/1.73M2
ENA SS-A AB SER IA-ACNC: <0.2 AL
ENA SS-B AB SER IA-ACNC: <0.2 AL
GLUCOSE SERPL-MCNC: 103 MG/DL
IGA SER QL IEP: 141 MG/DL
IGG SER QL IEP: 1028 MG/DL
IGG SER QL IEP: 1028 MG/DL
IGG1 SER-MCNC: 573 MG/DL
IGG2 SER-MCNC: 445 MG/DL
IGG3 SER-MCNC: 75.9 MG/DL
IGG4 SER-MCNC: 17.5 MG/DL
IGM SER QL IEP: 111 MG/DL
KAPPA LC CSF-MCNC: 2.13 MG/DL
KAPPA LC SERPL-MCNC: 2.08 MG/DL
POTASSIUM SERPL-SCNC: 5.2 MMOL/L
PROT SERPL-MCNC: 7.2 G/DL
RHEUMATOID FACT SER QL: 16 IU/ML
SODIUM SERPL-SCNC: 141 MMOL/L
TOTAL IGE SMQN RAST: 13 KU/L

## 2024-06-13 LAB
ACE BLD-CCNC: 54 U/L
CCP AB SER IA-ACNC: <8 UNITS
RF+CCP IGG SER-IMP: NEGATIVE

## 2024-07-08 ENCOUNTER — APPOINTMENT (OUTPATIENT)
Dept: PULMONOLOGY | Facility: CLINIC | Age: 79
End: 2024-07-08

## 2024-09-23 ENCOUNTER — APPOINTMENT (OUTPATIENT)
Dept: PULMONOLOGY | Facility: CLINIC | Age: 79
End: 2024-09-23
Payer: COMMERCIAL

## 2024-09-23 LAB — BACTERIA SPT CULT: NORMAL

## 2024-09-23 PROCEDURE — 99441: CPT

## 2024-09-23 NOTE — HISTORY OF PRESENT ILLNESS
[Home] : at home, [unfilled] , at the time of the visit. [Medical Office: ([facility])___] : at the medical office located in  [TextBox_4] : 80 yo woman with h/o bronchiectasis and NTM LD who presents for initiation evaluation  Initial visit on 6/10/24 For over 10 years, recurrent pneumonias. Most recent one in April of this year (one prior to that in ) No childhood problems with infections, breathing, lungs. Strong fam hx of bxsis (mother  of it) and NTM LD (mother and daughter). Daughter also has severe asthma. Pt's son "prone to bronchitis". Brother is healthy Was seeing Dr. Stanley for bxsis and NTM for years. Was prescribed airway clearance (Aerobika). Never needed MAC rx Pt's daughter also seeing Dr. QUEVEDO and has not needed NTM rx In April, while in [place], pt dx w PNA. Rx with Z-ack, then Augmentin. No cx done per pt. Finally felt better about 2 weeks ago. Has lost 18 lb. Chect XR and CT done by PCP: progression of bxsis findings Feeling mostly well at this time.  No cough Does have dyspnea since pna above. Has to walk more slowly to catch breath. Drenching night sweats since pna. Improving though; no longer drenching. Chronic postnasal drip. No therapy Chronic GERD (on Dexilant on sx)  24 Called for an urgent visit before the upcoming visit on  7 weeks of cough p/p green sputum while traveling in [place] where a CXR was done and she was told she had bronchitis. An abx (unclear name) was given to the pt. No improvement In the [place] for the past 1 week. No improvement No fever Thick sputum Submitted sputum for cx last week: "pharyngeal fina" Requests CXR

## 2024-09-23 NOTE — ASSESSMENT
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

## 2024-09-24 ENCOUNTER — APPOINTMENT (OUTPATIENT)
Dept: RADIOLOGY | Facility: CLINIC | Age: 79
End: 2024-09-24
Payer: COMMERCIAL

## 2024-09-24 ENCOUNTER — OUTPATIENT (OUTPATIENT)
Dept: OUTPATIENT SERVICES | Facility: HOSPITAL | Age: 79
LOS: 1 days | End: 2024-09-24

## 2024-09-24 PROCEDURE — 71046 X-RAY EXAM CHEST 2 VIEWS: CPT | Mod: 26

## 2024-09-26 ENCOUNTER — APPOINTMENT (OUTPATIENT)
Dept: PULMONOLOGY | Facility: CLINIC | Age: 79
End: 2024-09-26
Payer: COMMERCIAL

## 2024-09-26 VITALS
HEART RATE: 93 BPM | SYSTOLIC BLOOD PRESSURE: 115 MMHG | TEMPERATURE: 97.9 F | BODY MASS INDEX: 21.38 KG/M2 | OXYGEN SATURATION: 95 % | DIASTOLIC BLOOD PRESSURE: 74 MMHG | WEIGHT: 133 LBS | HEIGHT: 66 IN

## 2024-09-26 PROCEDURE — G2211 COMPLEX E/M VISIT ADD ON: CPT | Mod: NC

## 2024-09-26 PROCEDURE — 99214 OFFICE O/P EST MOD 30 MIN: CPT

## 2024-09-26 RX ORDER — LEVOFLOXACIN 500 MG/1
500 TABLET, FILM COATED ORAL DAILY
Qty: 7 | Refills: 0 | Status: ACTIVE | COMMUNITY
Start: 2024-09-26 | End: 1900-01-01

## 2024-09-27 LAB
BASOPHILS # BLD AUTO: 0.08 K/UL
BASOPHILS NFR BLD AUTO: 0.5 %
EOSINOPHIL # BLD AUTO: 0.3 K/UL
EOSINOPHIL NFR BLD AUTO: 1.7 %
HCT VFR BLD CALC: 42 %
HGB BLD-MCNC: 13.3 G/DL
IMM GRANULOCYTES NFR BLD AUTO: 0.4 %
LYMPHOCYTES # BLD AUTO: 2.12 K/UL
LYMPHOCYTES NFR BLD AUTO: 12.1 %
MAN DIFF?: NORMAL
MCHC RBC-ENTMCNC: 28.9 PG
MCHC RBC-ENTMCNC: 31.7 GM/DL
MCV RBC AUTO: 91.1 FL
MONOCYTES # BLD AUTO: 0.91 K/UL
MONOCYTES NFR BLD AUTO: 5.2 %
NEUTROPHILS # BLD AUTO: 14.03 K/UL
NEUTROPHILS NFR BLD AUTO: 80.1 %
PLATELET # BLD AUTO: 311 K/UL
RBC # BLD: 4.61 M/UL
RBC # FLD: 14 %
WBC # FLD AUTO: 17.51 K/UL

## 2024-09-27 NOTE — HISTORY OF PRESENT ILLNESS
[TextBox_4] : 78 yo woman with h/o bronchiectasis and NTM LD who presents for initiation evaluation  Initial visit on 6/10/24 For over 10 years, recurrent pneumonias. Most recent one in April of this year (one prior to that in 2019) No childhood problems with infections, breathing, lungs. Strong fam hx of bxsis (mother  of it) and NTM LD (mother and daughter). Daughter also has severe asthma. Pt's son "prone to bronchitis". Brother is healthy Was seeing Dr. Stanley for bxsis and NTM for years. Was prescribed airway clearance (Aerobika). Never needed MAC rx Pt's daughter also seeing Dr. QUEVEDO and has not needed NTM rx In April, while in [place], pt dx w PNA. Rx with Z-ack, then Augmentin. No cx done per pt. Finally felt better about 2 weeks ago. Has lost 18 lb. Chect XR and CT done by PCP: progression of bxsis findings Feeling mostly well at this time. No cough Does have dyspnea since pna above. Has to walk more slowly to catch breath. Drenching night sweats since pna. Improving though; no longer drenching. Chronic postnasal drip. No therapy Chronic GERD (on Dexilant on sx)  24 Called for an urgent visit before the upcoming visit on  7 weeks of cough p/p green sputum while traveling in [place] where a CXR was done and she was told she had bronchitis. An abx (unclear name) was given to the pt. No improvement In the [place] for the past 1 week. No improvement No fever Thick sputum Submitted sputum for cx last week: "pharyngeal fina"; no PMN on gram stain Requests CXR.  24 CXR: Right lower lung opacities with opacity overlying cardiac silhouette seen best on lateral view compatible with right middle lobe atelectasis/infiltrate. Right upper lung and left lung grossly clear. No pleural effusion or pneumothorax. Unremarkable cardiac silhouette. No acute bone abnormality. Sputum Gm stain: few PMN, rare Squams, rare yeast, few GP cocci in clusters, few GPC in pairs and chains

## 2024-09-27 NOTE — HISTORY OF PRESENT ILLNESS
[TextBox_4] : 78 yo woman with h/o bronchiectasis and NTM LD who presents for initiation evaluation  Initial visit on 6/10/24 For over 10 years, recurrent pneumonias. Most recent one in April of this year (one prior to that in 2019) No childhood problems with infections, breathing, lungs. Strong fam hx of bxsis (mother  of it) and NTM LD (mother and daughter). Daughter also has severe asthma. Pt's son "prone to bronchitis". Brother is healthy Was seeing Dr. Stanley for bxsis and NTM for years. Was prescribed airway clearance (Aerobika). Never needed MAC rx Pt's daughter also seeing Dr. QUEVEDO and has not needed NTM rx In April, while in [place], pt dx w PNA. Rx with Z-ack, then Augmentin. No cx done per pt. Finally felt better about 2 weeks ago. Has lost 18 lb. Chect XR and CT done by PCP: progression of bxsis findings Feeling mostly well at this time. No cough Does have dyspnea since pna above. Has to walk more slowly to catch breath. Drenching night sweats since pna. Improving though; no longer drenching. Chronic postnasal drip. No therapy Chronic GERD (on Dexilant on sx)  24 Called for an urgent visit before the upcoming visit on  7 weeks of cough p/p green sputum while traveling in [place] where a CXR was done and she was told she had bronchitis. An abx (unclear name) was given to the pt. No improvement In the [place] for the past 1 week. No improvement No fever Thick sputum Submitted sputum for cx last week: "pharyngeal fina"; no PMN on gram stain Requests CXR.  24 CXR: Right lower lung opacities with opacity overlying cardiac silhouette seen best on lateral view compatible with right middle lobe atelectasis/infiltrate. Right upper lung and left lung grossly clear. No pleural effusion or pneumothorax. Unremarkable cardiac silhouette. No acute bone abnormality. Sputum Gm stain: few PMN, rare Squams, rare yeast, few GP cocci in clusters, few GPC in pairs and chains

## 2024-09-27 NOTE — ASSESSMENT
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 Abnormal lung exam and CXR. Elevated WBC of 17.5. Sputum Cx with H. influenza.  --Levaquin   #Bronchiectasis #NTM LD #Recurrent pneumonias #Obstructive airways disease (moderate obstruction, partial BD response) #Fam hx of bxsis and NTM LD #Fam hx of asthma #Post-nasal drip #GERD #LE edema  continue airway clearance twice a day:   -2 puffs of albuterol via spacer ( Inhaler technique was reviewed, and proper technique was demonstrated to the patient; spacer provided)   -hypertonic (3%) saline neb x 15-20 min   -Aerobika x 10 min (device use demonstrated)   -Abdul-cough (technique demonstrated) --FU 1 week

## 2024-09-27 NOTE — HISTORY OF PRESENT ILLNESS
[TextBox_4] : 80 yo woman with h/o bronchiectasis and NTM LD who presents for initiation evaluation  Initial visit on 6/10/24 For over 10 years, recurrent pneumonias. Most recent one in April of this year (one prior to that in 2019) No childhood problems with infections, breathing, lungs. Strong fam hx of bxsis (mother  of it) and NTM LD (mother and daughter). Daughter also has severe asthma. Pt's son "prone to bronchitis". Brother is healthy Was seeing Dr. Stanley for bxsis and NTM for years. Was prescribed airway clearance (Aerobika). Never needed MAC rx Pt's daughter also seeing Dr. QUEVEDO and has not needed NTM rx In April, while in [place], pt dx w PNA. Rx with Z-ack, then Augmentin. No cx done per pt. Finally felt better about 2 weeks ago. Has lost 18 lb. Chect XR and CT done by PCP: progression of bxsis findings Feeling mostly well at this time. No cough Does have dyspnea since pna above. Has to walk more slowly to catch breath. Drenching night sweats since pna. Improving though; no longer drenching. Chronic postnasal drip. No therapy Chronic GERD (on Dexilant on sx)  24 Called for an urgent visit before the upcoming visit on  7 weeks of cough p/p green sputum while traveling in [place] where a CXR was done and she was told she had bronchitis. An abx (unclear name) was given to the pt. [place] improvement [place] the [place] for the past 1 week. No improvement No fever Thick sputum Submitted sputum for cx last week: "pharyngeal fina"; no PMN on gram stain Requests CXR.  24 CXR: Right lower lung opacities with opacity overlying cardiac silhouette seen best on lateral view compatible with right middle lobe atelectasis/infiltrate. Right upper lung and left lung grossly clear. No pleural effusion or pneumothorax. Unremarkable cardiac silhouette. No acute bone abnormality. Sputum Gm stain: few PMN, rare Squams, rare yeast, few GP cocci in clusters, few GPC in pairs and chains

## 2024-09-27 NOTE — PHYSICAL EXAM
[TextEntry] : Gen: Pleasant in no distress HEENT: Sclera non-icteric, conjunctiva non-injected; oropharynx clear w/o thrush;  Neck: supple w/o cervical LAD; no bruits CV: Regular rate and rhythm, no murmurs, rubs or gallops Lungs: right base rhonchi Extremities: no clubbing, cyanosis; trace 1+ L>R ankle edema Skin: no rash Psych: normal mood and affect Neuro:  non-focal

## 2024-09-27 NOTE — ASSESSMENT
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 Abnormal lung exam and CXR. Elevated WBC of 17.5. Sputum Cx with H. influenza.  --Levaquin   #Bronchiectasis #NTM LD #Recurrent pneumonias #Obstructive airways disease (moderate obstruction, partial BD response) #Fam hx of bxsis and NTM LD #Fam hx of asthma #Post-nasal drip #GERD #LE edema  continue airway clearance twice a day:   -2 puffs of albuterol via spacer ( Inhaler technique was reviewed, and proper technique was demonstrated to the patient; spacer provided)   -hypertonic (3%) saline neb x 15-20 min   -Aerobika x 10 min (device use demonstrated)   -Abdul-cough (technique demonstrated) --FU 1 week

## 2024-09-28 LAB
ACID FAST STN SPT: NORMAL
FUNGUS SPT CULT: NORMAL

## 2024-09-30 ENCOUNTER — APPOINTMENT (OUTPATIENT)
Dept: PULMONOLOGY | Facility: CLINIC | Age: 79
End: 2024-09-30
Payer: COMMERCIAL

## 2024-09-30 VITALS
HEIGHT: 66 IN | SYSTOLIC BLOOD PRESSURE: 87 MMHG | DIASTOLIC BLOOD PRESSURE: 64 MMHG | WEIGHT: 133 LBS | BODY MASS INDEX: 21.38 KG/M2 | TEMPERATURE: 96.9 F | HEART RATE: 82 BPM | OXYGEN SATURATION: 94 %

## 2024-09-30 LAB — BACTERIA SPT CULT: ABNORMAL

## 2024-09-30 PROCEDURE — G2211 COMPLEX E/M VISIT ADD ON: CPT | Mod: NC

## 2024-09-30 PROCEDURE — 99212 OFFICE O/P EST SF 10 MIN: CPT

## 2024-09-30 RX ORDER — LISINOPRIL 20 MG/1
20 TABLET ORAL
Refills: 0 | Status: ACTIVE | COMMUNITY

## 2024-09-30 NOTE — ASSESSMENT
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 Improving on LVQ #Bronchiectasis --continue airway clearance  #NTM LD #Recurrent pneumonias #Obstructive airways disease (moderate obstruction, partial BD response) #Fam hx of bxsis and NTM LD #Fam hx of asthma #Post-nasal drip #GERD #LE edema  --complete 7 days if Levaquin -continue airway clearance twice a day:   -2 puffs of albuterol via spacer ( Inhaler technique was reviewed, and proper technique was demonstrated to the patient; spacer provided)   -hypertonic (3%) saline neb x 15-20 min   -Air physio    -Abdul-cough (technique demonstrated) --FU 4-6 weeks with CXR

## 2024-09-30 NOTE — HISTORY OF PRESENT ILLNESS
[TextBox_4] : 78 yo woman with h/o bronchiectasis and NTM LD who presents for initiation evaluation  Initial visit on 6/10/24 For over 10 years, recurrent pneumonias. Most recent one in April of this year (one prior to that in 2019) No childhood problems with infections, breathing, lungs. Strong fam hx of bxsis (mother  of it) and NTM LD (mother and daughter). Daughter also has severe asthma. Pt's son "prone to bronchitis". Brother is healthy Was seeing Dr. Stanley for bxsis and NTM for years. Was prescribed airway clearance (Aerobika). Never needed MAC rx Pt's daughter also seeing Dr. QUEVEDO and has not needed NTM rx In April, while in [place], pt dx w PNA. Rx with Z-ack, then Augmentin. No cx done per pt. Finally felt better about 2 weeks ago. Has lost 18 lb. Chect XR and CT done by PCP: progression of bxsis findings Feeling mostly well at this time. No cough Does have dyspnea since pna above. Has to walk more slowly to catch breath. Drenching night sweats since pna. Improving though; no longer drenching. Chronic postnasal drip. No therapy Chronic GERD (on Dexilant on sx)  24 Called for an urgent visit before the upcoming visit on  7 weeks of cough p/p green sputum while traveling in [place] where a CXR was done and she was told she had bronchitis. An abx (unclear name) was given to the pt. [place] improvement [place] the [place] for the past 1 week. No improvement No fever Thick sputum Submitted sputum for cx last week: "pharyngeal fina"; no PMN on gram stain Requests CXR.  24 CXR: Right lower lung opacities with opacity overlying cardiac silhouette seen best on lateral view compatible with right middle lobe atelectasis/infiltrate. Right upper lung and left lung grossly clear. No pleural effusion or pneumothorax. Unremarkable cardiac silhouette. No acute bone abnormality. Sputum Gm stain: few PMN, rare Squams, rare yeast, few GP cocci in clusters, few GPC in pairs and chains  24 Sp Cx: H flu WBC 17 Started LVQ on  Feeling better Less cough

## 2024-09-30 NOTE — RESULTS/DATA
[TextEntry] : RADIOLOGY 24 CXR IMPRESSION:  Right lower lung opacities with opacity overlying cardiac silhouette seen best on lateral view compatible with right middle lobe atelectasis/infiltrate. Right upper lung and left lung grossly clear. No pleural effusion or pneumothorax. Unremarkable cardiac silhouette. No acute bone abnormality.   24 CT chest FINDINGS: LUNGS AND AIRWAYS: Persistent atelectasis and bronchiectasis within the lingula and right middle lobe, overall unchanged since 2024 and slightly progressed since 2022. Moderate severity micronodular tree-in-bud branching opacities within the subpleural right lower lobe, lingula, right middle lobe and subpleural left lower lobe is overall progressed since 2022. There are focal regions of patchy atelectasis within the subpleural region of the right lower lobe also progressed since 2024 and 2022.. No suspicious pulmonary finding. PLEURA: No pleural effusion. MEDIASTINUM AND TAMIKO: No lymphadenopathy. VESSELS: No central pulmonary embolus. HEART: Heart size is normal. No pericardial effusion. Minimal calcification of the aortic valve, which can correlate with degree of aortic stenosis. CHEST WALL AND LOWER NECK: Within normal limits. VISUALIZED UPPER ABDOMEN: Right renal cortical scarring. Punctate 1 mm calculus in the interpolar region of the right kidney. BONES: Within normal limits.  IMPRESSION:  Overall interval progression of moderate multifocal infectious/inflammatory bronchiolitis; etiologies include EVER; progressive multifocal atelectasis.   PFT 6/10/2024 Prebronchodilator FVC: 2.17 L, 83% predicted Prebronchodilator FEV1: 1.29 L, 66% predicted FEV1/FVC: 60% Postbronchodilator FVC 2.38 L, 92% predicted (+10%) Postbronchodilator FEV1: 1.39 L, 71% predicted (+8%) Postbronchodilator FEV1/FVC: 58% T.07 L, 99% predicted RV: 2.85 L, 125% predicted RV/T% DLCO unadjusted for hemoglobin: 13.3 units, 68% predicted Impression: Moderate obstructive ventilatory deficit with partial bronchodilator response.  No hyperinflation.  Mild air trapping.  Mildly reduced diffusing capacity   EKG / ECHO      MICRO     PATH    OTHER LABS OF NOTE

## 2024-10-04 LAB — FUNGUS SPT CULT: ABNORMAL

## 2024-10-05 LAB — ACID FAST STN SPT: NORMAL

## 2024-11-04 ENCOUNTER — APPOINTMENT (OUTPATIENT)
Dept: RADIOLOGY | Facility: CLINIC | Age: 79
End: 2024-11-04
Payer: COMMERCIAL

## 2024-11-04 ENCOUNTER — RESULT REVIEW (OUTPATIENT)
Age: 79
End: 2024-11-04

## 2024-11-04 ENCOUNTER — APPOINTMENT (OUTPATIENT)
Dept: PULMONOLOGY | Facility: CLINIC | Age: 79
End: 2024-11-04

## 2024-11-04 ENCOUNTER — OUTPATIENT (OUTPATIENT)
Dept: OUTPATIENT SERVICES | Facility: HOSPITAL | Age: 79
LOS: 1 days | End: 2024-11-04

## 2024-11-04 PROCEDURE — 71046 X-RAY EXAM CHEST 2 VIEWS: CPT | Mod: 26

## 2024-11-20 ENCOUNTER — APPOINTMENT (OUTPATIENT)
Dept: ENDOCRINOLOGY | Facility: CLINIC | Age: 79
End: 2024-11-20
Payer: COMMERCIAL

## 2024-11-20 VITALS
SYSTOLIC BLOOD PRESSURE: 118 MMHG | BODY MASS INDEX: 21.47 KG/M2 | DIASTOLIC BLOOD PRESSURE: 70 MMHG | WEIGHT: 133 LBS | HEART RATE: 73 BPM

## 2024-11-20 DIAGNOSIS — M81.0 AGE-RELATED OSTEOPOROSIS W/OUT CURRENT PATHOLOGICAL FRACTURE: ICD-10-CM

## 2024-11-20 DIAGNOSIS — Z84.89 FAMILY HISTORY OF OTHER SPECIFIED CONDITIONS: ICD-10-CM

## 2024-11-20 PROCEDURE — 99204 OFFICE O/P NEW MOD 45 MIN: CPT

## 2024-11-20 RX ORDER — CHROMIUM 200 MCG
TABLET ORAL
Refills: 0 | Status: ACTIVE | COMMUNITY

## 2024-11-20 RX ORDER — METHYLDOPA/HYDROCHLOROTHIAZIDE 250MG-15MG
TABLET ORAL
Refills: 0 | Status: ACTIVE | COMMUNITY

## 2024-11-20 RX ORDER — PSEUDOEPHEDRINE HCL 30 MG
TABLET ORAL
Refills: 0 | Status: ACTIVE | COMMUNITY

## 2024-12-05 ENCOUNTER — NON-APPOINTMENT (OUTPATIENT)
Age: 79
End: 2024-12-05

## 2024-12-05 ENCOUNTER — APPOINTMENT (OUTPATIENT)
Dept: OTOLARYNGOLOGY | Facility: CLINIC | Age: 79
End: 2024-12-05
Payer: COMMERCIAL

## 2024-12-05 VITALS — BODY MASS INDEX: 23.92 KG/M2 | WEIGHT: 130 LBS | HEIGHT: 62 IN

## 2024-12-05 DIAGNOSIS — H61.23 IMPACTED CERUMEN, BILATERAL: ICD-10-CM

## 2024-12-05 DIAGNOSIS — Z87.01 PERSONAL HISTORY OF PNEUMONIA (RECURRENT): ICD-10-CM

## 2024-12-05 DIAGNOSIS — R42 DIZZINESS AND GIDDINESS: ICD-10-CM

## 2024-12-05 DIAGNOSIS — Z87.09 PERSONAL HISTORY OF OTHER DISEASES OF THE RESPIRATORY SYSTEM: ICD-10-CM

## 2024-12-05 PROCEDURE — 99203 OFFICE O/P NEW LOW 30 MIN: CPT | Mod: 25

## 2024-12-05 PROCEDURE — 69210 REMOVE IMPACTED EAR WAX UNI: CPT

## 2024-12-05 RX ORDER — LISINOPRIL 30 MG/1
TABLET ORAL
Refills: 0 | Status: ACTIVE | COMMUNITY

## 2024-12-05 RX ORDER — DEXLANSOPRAZOLE 60 MG/1
CAPSULE, DELAYED RELEASE ORAL
Refills: 0 | Status: ACTIVE | COMMUNITY

## 2024-12-31 ENCOUNTER — APPOINTMENT (OUTPATIENT)
Dept: RADIOLOGY | Facility: CLINIC | Age: 79
End: 2024-12-31

## 2024-12-31 ENCOUNTER — OUTPATIENT (OUTPATIENT)
Dept: OUTPATIENT SERVICES | Facility: HOSPITAL | Age: 79
LOS: 1 days | End: 2024-12-31

## 2024-12-31 PROCEDURE — 72100 X-RAY EXAM L-S SPINE 2/3 VWS: CPT | Mod: 26

## 2025-01-09 ENCOUNTER — APPOINTMENT (OUTPATIENT)
Dept: OTOLARYNGOLOGY | Facility: CLINIC | Age: 80
End: 2025-01-09
Payer: COMMERCIAL

## 2025-01-09 VITALS — WEIGHT: 175 LBS | BODY MASS INDEX: 29.88 KG/M2 | HEIGHT: 64 IN

## 2025-01-09 DIAGNOSIS — Z87.09 PERSONAL HISTORY OF OTHER DISEASES OF THE RESPIRATORY SYSTEM: ICD-10-CM

## 2025-01-09 DIAGNOSIS — H91.13 PRESBYCUSIS, BILATERAL: ICD-10-CM

## 2025-01-09 DIAGNOSIS — Z80.9 FAMILY HISTORY OF MALIGNANT NEOPLASM, UNSPECIFIED: ICD-10-CM

## 2025-01-09 DIAGNOSIS — H90.3 SENSORINEURAL HEARING LOSS, BILATERAL: ICD-10-CM

## 2025-01-09 PROCEDURE — 99213 OFFICE O/P EST LOW 20 MIN: CPT

## 2025-01-09 PROCEDURE — 92567 TYMPANOMETRY: CPT

## 2025-01-09 PROCEDURE — 92557 COMPREHENSIVE HEARING TEST: CPT

## 2025-01-10 PROBLEM — H90.3 SENSORINEURAL HEARING LOSS (SNHL) OF BOTH EARS: Status: ACTIVE | Noted: 2025-01-10

## 2025-01-28 ENCOUNTER — OUTPATIENT (OUTPATIENT)
Dept: OUTPATIENT SERVICES | Facility: HOSPITAL | Age: 80
LOS: 1 days | End: 2025-01-28

## 2025-01-28 ENCOUNTER — APPOINTMENT (OUTPATIENT)
Dept: RADIOLOGY | Facility: CLINIC | Age: 80
End: 2025-01-28
Payer: COMMERCIAL

## 2025-01-28 ENCOUNTER — APPOINTMENT (OUTPATIENT)
Dept: CT IMAGING | Facility: CLINIC | Age: 80
End: 2025-01-28
Payer: COMMERCIAL

## 2025-01-28 PROCEDURE — 77085 DXA BONE DENSITY AXL VRT FX: CPT | Mod: 26

## 2025-01-28 PROCEDURE — 70480 CT ORBIT/EAR/FOSSA W/O DYE: CPT | Mod: 26

## 2025-03-14 ENCOUNTER — APPOINTMENT (OUTPATIENT)
Dept: ORTHOPEDIC SURGERY | Facility: CLINIC | Age: 80
End: 2025-03-14

## 2025-03-14 VITALS — WEIGHT: 175 LBS | HEIGHT: 64 IN | BODY MASS INDEX: 29.88 KG/M2 | RESPIRATION RATE: 16 BRPM

## 2025-03-14 VITALS — BODY MASS INDEX: 29.88 KG/M2 | HEIGHT: 64 IN | RESPIRATION RATE: 16 BRPM | WEIGHT: 175 LBS

## 2025-03-14 DIAGNOSIS — M19.049 PRIMARY OSTEOARTHRITIS, UNSPECIFIED HAND: ICD-10-CM

## 2025-03-14 PROCEDURE — 99214 OFFICE O/P EST MOD 30 MIN: CPT | Mod: 25

## 2025-03-14 PROCEDURE — 20600 DRAIN/INJ JOINT/BURSA W/O US: CPT | Mod: 50

## 2025-03-19 ENCOUNTER — APPOINTMENT (OUTPATIENT)
Dept: ORTHOPEDIC SURGERY | Facility: CLINIC | Age: 80
End: 2025-03-19

## 2025-03-19 VITALS — WEIGHT: 135 LBS | HEIGHT: 64 IN | BODY MASS INDEX: 23.05 KG/M2

## 2025-03-19 DIAGNOSIS — M75.82 OTHER SHOULDER LESIONS, LEFT SHOULDER: ICD-10-CM

## 2025-03-19 DIAGNOSIS — M75.42 IMPINGEMENT SYNDROME OF LEFT SHOULDER: ICD-10-CM

## 2025-03-19 PROCEDURE — 76882 US LMTD JT/FCL EVL NVASC XTR: CPT | Mod: 59,LT

## 2025-03-19 PROCEDURE — 20611 DRAIN/INJ JOINT/BURSA W/US: CPT | Mod: LT

## 2025-03-19 PROCEDURE — 99214 OFFICE O/P EST MOD 30 MIN: CPT | Mod: 25

## 2025-04-01 ENCOUNTER — APPOINTMENT (OUTPATIENT)
Dept: RADIOLOGY | Facility: CLINIC | Age: 80
End: 2025-04-01
Payer: COMMERCIAL

## 2025-04-01 ENCOUNTER — OUTPATIENT (OUTPATIENT)
Dept: OUTPATIENT SERVICES | Facility: HOSPITAL | Age: 80
LOS: 1 days | End: 2025-04-01

## 2025-04-01 ENCOUNTER — RESULT REVIEW (OUTPATIENT)
Age: 80
End: 2025-04-01

## 2025-04-01 PROCEDURE — 71046 X-RAY EXAM CHEST 2 VIEWS: CPT | Mod: 26

## 2025-04-04 ENCOUNTER — APPOINTMENT (OUTPATIENT)
Dept: ENDOCRINOLOGY | Facility: CLINIC | Age: 80
End: 2025-04-04

## 2025-04-04 ENCOUNTER — LABORATORY RESULT (OUTPATIENT)
Age: 80
End: 2025-04-04

## 2025-04-04 VITALS
BODY MASS INDEX: 22.66 KG/M2 | DIASTOLIC BLOOD PRESSURE: 72 MMHG | HEART RATE: 91 BPM | WEIGHT: 132 LBS | SYSTOLIC BLOOD PRESSURE: 116 MMHG

## 2025-04-04 PROCEDURE — 99215 OFFICE O/P EST HI 40 MIN: CPT

## 2025-04-04 PROCEDURE — 36415 COLL VENOUS BLD VENIPUNCTURE: CPT

## 2025-04-07 ENCOUNTER — TRANSCRIPTION ENCOUNTER (OUTPATIENT)
Age: 80
End: 2025-04-07

## 2025-04-07 LAB
25(OH)D3 SERPL-MCNC: 72.8 NG/ML
ANION GAP SERPL CALC-SCNC: 15 MMOL/L
BUN SERPL-MCNC: 13 MG/DL
CALCIUM SERPL-MCNC: 9.7 MG/DL
CALCIUM SERPL-MCNC: 9.7 MG/DL
CHLORIDE SERPL-SCNC: 97 MMOL/L
CO2 SERPL-SCNC: 25 MMOL/L
CREAT SERPL-MCNC: 0.85 MG/DL
EGFRCR SERPLBLD CKD-EPI 2021: 69 ML/MIN/1.73M2
GLUCOSE SERPL-MCNC: 103 MG/DL
PARATHYROID HORMONE INTACT: 40 PG/ML
POTASSIUM SERPL-SCNC: 3.9 MMOL/L
SODIUM SERPL-SCNC: 137 MMOL/L

## 2025-04-08 LAB — ALP BONE SERPL-MCNC: 9.6 UG/L

## 2025-04-09 LAB
ALBUMIN MFR SERPL ELPH: 54 %
ALBUMIN SERPL-MCNC: 3.9 G/DL
ALBUMIN/GLOB SERPL: 1.2 RATIO
ALPHA1 GLOB MFR SERPL ELPH: 7.1 %
ALPHA1 GLOB SERPL ELPH-MCNC: 0.5 G/DL
ALPHA2 GLOB MFR SERPL ELPH: 12.4 %
ALPHA2 GLOB SERPL ELPH-MCNC: 0.9 G/DL
B-GLOBULIN MFR SERPL ELPH: 12.5 %
B-GLOBULIN SERPL ELPH-MCNC: 0.9 G/DL
GAMMA GLOB FLD ELPH-MCNC: 1 G/DL
GAMMA GLOB MFR SERPL ELPH: 14 %
INTERPRETATION SERPL IEP-IMP: NORMAL
PROT SERPL-MCNC: 7.2 G/DL
PROT SERPL-MCNC: 7.2 G/DL

## 2025-05-06 NOTE — ED ADULT TRIAGE NOTE - HEIGHT IN CM
"Subjective   Patient ID: Ellie Casillas is a 55 y.o. female who presents for Annual Exam and Follow-up (Hyperlipidemia, Anxiety and Depression).    Patient presents today for annual physical exam and follow up on Hyperlipidemia. She has no chest pain, palpitations, dyspnea, orthopnea, PND or peripheral edema. She has been taking her medication regularly and has no side effects.    Anxiety  Presents for follow-up visit. Symptoms include depressed mood, excessive worry, insomnia, irritability, nervous/anxious behavior and restlessness. Patient reports no chest pain, decreased concentration, dizziness, palpitations, shortness of breath or suicidal ideas. Symptoms occur occasionally. The quality of sleep is poor. Nighttime awakenings: several.     Compliance with medications is %.        Review of Systems   Constitutional:  Positive for irritability. Negative for chills and fever.   HENT:  Negative for congestion, ear pain, nosebleeds, rhinorrhea and sore throat.    Respiratory:  Negative for cough, shortness of breath and wheezing.    Cardiovascular:  Negative for chest pain, palpitations and leg swelling.   Gastrointestinal:  Negative for abdominal pain, constipation, diarrhea and vomiting.   Genitourinary:  Negative for dysuria, frequency and hematuria.   Neurological:  Negative for dizziness, tremors, numbness and headaches.   Psychiatric/Behavioral:  Negative for decreased concentration and suicidal ideas. The patient is nervous/anxious and has insomnia.        Objective   /90   Pulse 59   Temp 36.4 °C (97.5 °F)   Resp 19   Ht 1.626 m (5' 4\")   Wt 83.9 kg (185 lb)   SpO2 97%   BMI 31.76 kg/m²     Physical Exam  Constitutional:       General: She is not in acute distress.     Appearance: Normal appearance.   HENT:      Head: Normocephalic and atraumatic.      Mouth/Throat:      Mouth: Mucous membranes are moist.      Pharynx: Oropharynx is clear. No oropharyngeal exudate or posterior " "oropharyngeal erythema.   Eyes:      General: No scleral icterus.     Extraocular Movements: Extraocular movements intact.      Pupils: Pupils are equal, round, and reactive to light.   Cardiovascular:      Rate and Rhythm: Normal rate and regular rhythm.      Pulses: Normal pulses.      Heart sounds: No murmur heard.     No friction rub. No gallop.   Pulmonary:      Effort: Pulmonary effort is normal.      Breath sounds: No wheezing, rhonchi or rales.   Skin:     General: Skin is warm.      Coloration: Skin is not jaundiced or pale.      Findings: No erythema or rash.   Neurological:      General: No focal deficit present.      Mental Status: She is alert and oriented to person, place, and time.      Cranial Nerves: No cranial nerve deficit.      Sensory: No sensory deficit.      Coordination: Coordination normal.      Gait: Gait normal.         Assessment/Plan   Problem List Items Addressed This Visit       Elevated blood pressure reading without diagnosis of hypertension    Dietary sodium restriction.  Regular aerobic exercise program is recommended to help achieve and maintain normal body weight, fitness and improve lipid balance. .  Dietary changes: Increase soluble fiber  Plant sterols 2grams per day (e.g. Benecol)  Reduce saturated fat, \"trans\" monounsaturated fatty acids, and cholesterol         Generalized anxiety disorder    We will increase the Wellbutrin XL to 300 mg daily.  The risks and benefits of my recommendations, as well as other treatment options were discussed with the patient today.    The side effects of the medications were discussed.  Advised not to take the medication with alcohol .  Exercise regularly and this can give you a sense of well being and help decrease feelings of anxiety.;  Get plenty of sleep. Sleep rests your brain as well as your body, and can improve your general sense of wellbeing as well as your mood.;  Avoid alcohol and drug abuse. It may seem that alcohol or drugs relax " 160.02 you. But in the long run they make anxiety worse and cause more problems.;  Avoid caffeine. Caffeine is found in coffee, tea, soft drinks and chocolate. Caffeine may increase your sense of anxiety because it stimulates your nervous system. Also avoid over-the-counter diet pills, and cough and cold medicines that contain a decongestant.         Relevant Medications    buPROPion XL (Wellbutrin XL) 300 mg 24 hr tablet    busPIRone (Buspar) 10 mg tablet    Other Relevant Orders    Follow Up In [facility] - PCP    Healthcare maintenance - Primary    Recommend low-cholesterol diet, low-fat diet and low-salt diet.  The need for lifelong dietary compliance in order to reduce cardiac risk is recommended.  We will also recommend regular exercise program to improve lipid balance and overall health.  Recommend decreasing fat and cholesterol in diet, increasing aerobic exercise with a goal of 4 or more days per week         Mixed hyperlipidemia    The nature of cardiac risk has been fully discussed with this patient. Discussed cardiovascular risk analysis and appropriate diet with the need for lifelong measures to reduce the risk. A regular exercise program is recommended to help achieve and maintain normal body weight, fitness and improve lipid balance. Patient education provided. They understand and agree with this course of treatment. They will return with new or worsening symptoms. Patient instructed to remain current with appropriate annual health maintenance.          Relevant Medications    rosuvastatin (Crestor) 10 mg tablet    Other Relevant Orders    Follow Up In [facility] - PCP    Moderate episode of recurrent major depressive disorder    We will increase the Wellbutrin XL to 300 mg daily. Reviewed concept of depression as biochemical imbalance of neurotransmitters and rationale for treatment. Instructed patient to contact office or on-call physician promptly should condition worsen or any new  symptoms appear          Relevant Medications    buPROPion XL (Wellbutrin XL) 300 mg 24 hr tablet    Other Relevant Orders    Follow Up In [facility] - PCP     Scribe Attestation  By signing my name below, I, Gayle Garcia   attest that this documentation has been prepared under the direction and in the presence of Amadeo Ventura MD.

## 2025-05-27 NOTE — PHARMACY COMMUNICATION NOTE - COMMENTS
The Pharmacy emailed Dr. Rojas about using a CMP from 4-4-2025 for a 5- Prolia Treatment. Dr. Rojas said in the email to use the labs from 4-4-2025.

## 2025-05-28 ENCOUNTER — OUTPATIENT (OUTPATIENT)
Dept: OUTPATIENT SERVICES | Facility: HOSPITAL | Age: 80
LOS: 1 days | End: 2025-05-28
Payer: COMMERCIAL

## 2025-05-28 ENCOUNTER — APPOINTMENT (OUTPATIENT)
Dept: INFUSION THERAPY | Facility: CLINIC | Age: 80
End: 2025-05-28

## 2025-05-28 VITALS
SYSTOLIC BLOOD PRESSURE: 140 MMHG | OXYGEN SATURATION: 98 % | HEIGHT: 63 IN | TEMPERATURE: 98 F | WEIGHT: 119.93 LBS | DIASTOLIC BLOOD PRESSURE: 80 MMHG | RESPIRATION RATE: 15 BRPM | HEART RATE: 78 BPM

## 2025-05-28 DIAGNOSIS — M81.0 AGE-RELATED OSTEOPOROSIS WITHOUT CURRENT PATHOLOGICAL FRACTURE: ICD-10-CM

## 2025-05-28 PROCEDURE — 96372 THER/PROPH/DIAG INJ SC/IM: CPT

## 2025-05-28 RX ORDER — DENOSUMAB 60 MG/ML
60 INJECTION SUBCUTANEOUS ONCE
Refills: 0 | Status: COMPLETED | OUTPATIENT
Start: 2025-05-28 | End: 2025-05-28

## 2025-05-28 RX ADMIN — DENOSUMAB 60 MILLIGRAM(S): 60 INJECTION SUBCUTANEOUS at 18:03
